# Patient Record
(demographics unavailable — no encounter records)

---

## 2025-01-07 NOTE — SOCIAL HISTORY
[House] : [unfilled] lives in a house  [Radiator/Baseboard] : heating provided by radiator(s)/baseboard(s) [Central] : air conditioning provided by central unit [Dry] : dry [Dog] : dog [Single] : single [Humidifier] : does not use a humidifier [Dehumidifier] : does not use a dehumidifier [Cockroaches] : Patient states that there are no cockroaches in the home [Dust Mite Covers] : does not have dust mite covers [Feather Pillows] : does not have feather pillows [Feather Comforter] : does not have a feather comforter [Bedroom] : not in the bedroom [Basement] : not in the basement [Living Area] : not in the living area [Smokers in Household] : there are no smokers in the home

## 2025-01-07 NOTE — HISTORY OF PRESENT ILLNESS
[None] : The patient is currently asymptomatic [Asthma] : asthma [Allergic Rhinitis] : allergic rhinitis [Eczematous rashes] : eczematous rashes [Venom Reactions] : venom reactions [Drug Allergies] : drug allergies [de-identified] : ALBERT AHN is a 15 yo male who 1-2 months ago he had some crabcakes and within 30 minutes his hands turned itchy and blotchy, mom gave him Benadryl and it went away. He does have a history possible reaction to lobster. He has had all other shellfish and fish with no issues. New Years Eufemia, he looked in the mirror and saw his face completely red and felt a burning, mom states he had no itchy throat or shortness of breath related to the face reaction. During the ball drop he had a sip of Peach Secco Sharan for the first time and other foods which he states he's eaten before. Mom gave him Benadryl and within minutes it went away. Mom states he eats peaches with no problems, the only thing she might suspect is sulfites in the drink as a cause